# Patient Record
Sex: FEMALE | Race: WHITE | NOT HISPANIC OR LATINO | Employment: FULL TIME
[De-identification: names, ages, dates, MRNs, and addresses within clinical notes are randomized per-mention and may not be internally consistent; named-entity substitution may affect disease eponyms.]

---

## 2022-10-15 ENCOUNTER — HEALTH MAINTENANCE LETTER (OUTPATIENT)
Age: 37
End: 2022-10-15

## 2023-10-29 ENCOUNTER — HEALTH MAINTENANCE LETTER (OUTPATIENT)
Age: 38
End: 2023-10-29

## 2023-11-30 ENCOUNTER — APPOINTMENT (OUTPATIENT)
Dept: GENERAL RADIOLOGY | Facility: CLINIC | Age: 38
End: 2023-11-30
Attending: EMERGENCY MEDICINE
Payer: COMMERCIAL

## 2023-11-30 ENCOUNTER — HOSPITAL ENCOUNTER (EMERGENCY)
Facility: CLINIC | Age: 38
Discharge: HOME | End: 2023-11-30
Attending: EMERGENCY MEDICINE | Admitting: EMERGENCY MEDICINE
Payer: COMMERCIAL

## 2023-11-30 VITALS
TEMPERATURE: 97.7 F | HEART RATE: 101 BPM | RESPIRATION RATE: 16 BRPM | BODY MASS INDEX: 32.49 KG/M2 | HEIGHT: 65 IN | SYSTOLIC BLOOD PRESSURE: 135 MMHG | OXYGEN SATURATION: 100 % | DIASTOLIC BLOOD PRESSURE: 95 MMHG | WEIGHT: 195 LBS

## 2023-11-30 DIAGNOSIS — K80.20 GALLSTONES: ICD-10-CM

## 2023-11-30 DIAGNOSIS — R55 SYNCOPE AND COLLAPSE: Primary | ICD-10-CM

## 2023-11-30 DIAGNOSIS — D72.829 LEUKOCYTOSIS, UNSPECIFIED TYPE: ICD-10-CM

## 2023-11-30 DIAGNOSIS — U07.1 COVID-19 VIRUS INFECTION: ICD-10-CM

## 2023-11-30 LAB
ALBUMIN SERPL BCG-MCNC: 4.7 G/DL (ref 3.5–5.2)
ALP SERPL-CCNC: 78 U/L (ref 40–150)
ALT SERPL W P-5'-P-CCNC: 37 U/L (ref 0–50)
ANION GAP SERPL CALCULATED.3IONS-SCNC: 20 MMOL/L (ref 7–15)
AST SERPL W P-5'-P-CCNC: 45 U/L (ref 0–45)
ATRIAL RATE - MUSE: 99 BPM
BASOPHILS # BLD AUTO: 0.1 10E3/UL (ref 0–0.2)
BASOPHILS NFR BLD AUTO: 0 %
BILIRUB SERPL-MCNC: 1.1 MG/DL
BUN SERPL-MCNC: 12.4 MG/DL (ref 6–20)
CALCIUM SERPL-MCNC: 10.3 MG/DL (ref 8.6–10)
CHLORIDE SERPL-SCNC: 97 MMOL/L (ref 98–107)
CREAT SERPL-MCNC: 0.73 MG/DL (ref 0.51–0.95)
D DIMER PPP FEU-MCNC: <0.27 UG/ML FEU (ref 0–0.5)
DEPRECATED HCO3 PLAS-SCNC: 17 MMOL/L (ref 22–29)
DIASTOLIC BLOOD PRESSURE - MUSE: NORMAL MMHG
EGFRCR SERPLBLD CKD-EPI 2021: >90 ML/MIN/1.73M2
EOSINOPHIL # BLD AUTO: 0 10E3/UL (ref 0–0.7)
EOSINOPHIL NFR BLD AUTO: 0 %
ERYTHROCYTE [DISTWIDTH] IN BLOOD BY AUTOMATED COUNT: 15.1 % (ref 10–15)
FLUAV RNA SPEC QL NAA+PROBE: NEGATIVE
FLUBV RNA RESP QL NAA+PROBE: NEGATIVE
GLUCOSE BLDC GLUCOMTR-MCNC: 179 MG/DL (ref 70–99)
GLUCOSE SERPL-MCNC: 182 MG/DL (ref 70–99)
HCG SERPL QL: NEGATIVE
HCT VFR BLD AUTO: 46.3 % (ref 35–47)
HGB BLD-MCNC: 14.3 G/DL (ref 11.7–15.7)
IMM GRANULOCYTES # BLD: 0.1 10E3/UL
IMM GRANULOCYTES NFR BLD: 1 %
INTERPRETATION ECG - MUSE: NORMAL
LYMPHOCYTES # BLD AUTO: 2 10E3/UL (ref 0.8–5.3)
LYMPHOCYTES NFR BLD AUTO: 9 %
MAGNESIUM SERPL-MCNC: 1.7 MG/DL (ref 1.7–2.3)
MCH RBC QN AUTO: 24.9 PG (ref 26.5–33)
MCHC RBC AUTO-ENTMCNC: 30.9 G/DL (ref 31.5–36.5)
MCV RBC AUTO: 81 FL (ref 78–100)
MONOCYTES # BLD AUTO: 0.8 10E3/UL (ref 0–1.3)
MONOCYTES NFR BLD AUTO: 4 %
NEUTROPHILS # BLD AUTO: 19.3 10E3/UL (ref 1.6–8.3)
NEUTROPHILS NFR BLD AUTO: 86 %
NRBC # BLD AUTO: 0 10E3/UL
NRBC BLD AUTO-RTO: 0 /100
P AXIS - MUSE: 35 DEGREES
PLATELET # BLD AUTO: 277 10E3/UL (ref 150–450)
POTASSIUM SERPL-SCNC: 3.8 MMOL/L (ref 3.4–5.3)
PR INTERVAL - MUSE: 144 MS
PROT SERPL-MCNC: 9 G/DL (ref 6.4–8.3)
QRS DURATION - MUSE: 84 MS
QT - MUSE: 348 MS
QTC - MUSE: 446 MS
R AXIS - MUSE: 39 DEGREES
RBC # BLD AUTO: 5.75 10E6/UL (ref 3.8–5.2)
RSV RNA SPEC NAA+PROBE: NEGATIVE
SARS-COV-2 RNA RESP QL NAA+PROBE: POSITIVE
SODIUM SERPL-SCNC: 134 MMOL/L (ref 135–145)
SYSTOLIC BLOOD PRESSURE - MUSE: NORMAL MMHG
T AXIS - MUSE: 41 DEGREES
TROPONIN T SERPL HS-MCNC: <6 NG/L
TSH SERPL DL<=0.005 MIU/L-ACNC: 1.51 UIU/ML (ref 0.3–4.2)
VENTRICULAR RATE- MUSE: 99 BPM
WBC # BLD AUTO: 22.3 10E3/UL (ref 4–11)

## 2023-11-30 PROCEDURE — 99285 EMERGENCY DEPT VISIT HI MDM: CPT | Mod: 25

## 2023-11-30 PROCEDURE — 36415 COLL VENOUS BLD VENIPUNCTURE: CPT | Performed by: EMERGENCY MEDICINE

## 2023-11-30 PROCEDURE — 71046 X-RAY EXAM CHEST 2 VIEWS: CPT

## 2023-11-30 PROCEDURE — 84443 ASSAY THYROID STIM HORMONE: CPT | Performed by: EMERGENCY MEDICINE

## 2023-11-30 PROCEDURE — 258N000003 HC RX IP 258 OP 636: Performed by: EMERGENCY MEDICINE

## 2023-11-30 PROCEDURE — 96361 HYDRATE IV INFUSION ADD-ON: CPT

## 2023-11-30 PROCEDURE — 83735 ASSAY OF MAGNESIUM: CPT | Performed by: EMERGENCY MEDICINE

## 2023-11-30 PROCEDURE — 85379 FIBRIN DEGRADATION QUANT: CPT | Performed by: EMERGENCY MEDICINE

## 2023-11-30 PROCEDURE — 85004 AUTOMATED DIFF WBC COUNT: CPT | Performed by: EMERGENCY MEDICINE

## 2023-11-30 PROCEDURE — 80053 COMPREHEN METABOLIC PANEL: CPT | Performed by: EMERGENCY MEDICINE

## 2023-11-30 PROCEDURE — 82962 GLUCOSE BLOOD TEST: CPT

## 2023-11-30 PROCEDURE — 87637 SARSCOV2&INF A&B&RSV AMP PRB: CPT | Performed by: EMERGENCY MEDICINE

## 2023-11-30 PROCEDURE — 93005 ELECTROCARDIOGRAM TRACING: CPT

## 2023-11-30 PROCEDURE — 250N000011 HC RX IP 250 OP 636: Mod: JZ | Performed by: STUDENT IN AN ORGANIZED HEALTH CARE EDUCATION/TRAINING PROGRAM

## 2023-11-30 PROCEDURE — 96374 THER/PROPH/DIAG INJ IV PUSH: CPT

## 2023-11-30 PROCEDURE — 84703 CHORIONIC GONADOTROPIN ASSAY: CPT | Performed by: EMERGENCY MEDICINE

## 2023-11-30 PROCEDURE — 85025 COMPLETE CBC W/AUTO DIFF WBC: CPT | Performed by: EMERGENCY MEDICINE

## 2023-11-30 PROCEDURE — 84484 ASSAY OF TROPONIN QUANT: CPT | Performed by: EMERGENCY MEDICINE

## 2023-11-30 RX ORDER — ONDANSETRON 2 MG/ML
4 INJECTION INTRAMUSCULAR; INTRAVENOUS ONCE
Status: COMPLETED | OUTPATIENT
Start: 2023-11-30 | End: 2023-11-30

## 2023-11-30 RX ADMIN — SODIUM CHLORIDE 1000 ML: 9 INJECTION, SOLUTION INTRAVENOUS at 13:36

## 2023-11-30 RX ADMIN — ONDANSETRON 4 MG: 2 INJECTION INTRAMUSCULAR; INTRAVENOUS at 15:09

## 2023-11-30 ASSESSMENT — ACTIVITIES OF DAILY LIVING (ADL): ADLS_ACUITY_SCORE: 37

## 2023-11-30 NOTE — ED PROVIDER NOTES
"  History     Chief Complaint:  Syncope    The history is provided by the patient.      Cameron Madonna Dubin is a 38 year old female syncope. Patient passed out at work earlier today. She only has history of one other syncopal episode. Patient experienced preeclampsia with her most recent pregnancy. She experienced seizures, but is not on seizure medication currently. She thought she was having a seizure during this episode. A witness reports her face got clammy. She felt cold, then hot, wobbly, and tired. A witness caught her before she passed out. It is unknown if there was shaking during this episode. She endorses a mild cough for a month. It is difficult for her to breathe upon examination. No chest pain, abdominal pain, vomiting, diarrhea, headache, fever, sore throat, pain with urination, increased frequency, vaginal discharge, or vaginal bleeding. No history of blood clots.     Independent Historian:    present and corroborates the above.     Review of External Notes:   None     Medications:    Amlodipine   Labetalol   Lisinopril - hydrochlorothiazide   Glipizide XL  Metformin     Past Medical History:    Gestational diabetes   Hypertension   Seizures   SROM  Hyperlipidemia  Cholelithiasis   Diabetes     Past Surgical History:    Oral surgery      Physical Exam   Patient Vitals for the past 24 hrs:   BP Temp Temp src Pulse Resp SpO2 Height Weight   11/30/23 1255 (!) 135/95 97.7  F (36.5  C) Temporal 101 16 100 % 1.651 m (5' 5\") 88.5 kg (195 lb)      Physical Exam  General: Alert, appears well-developed and well-nourished. Cooperative.     In mild distress  HEENT:  Head:  Atraumatic  Ears:  External ears are normal  Mouth/Throat:  Oropharynx is without erythema or exudate and mucous membranes are moist.   Eyes:   Conjunctivae normal and EOM are normal. No scleral icterus.  CV:  Normal rate, regular rhythm, normal heart sounds and radial pulses are 2+ and symmetric.  No murmur.  Resp:  Breath sounds are " clear bilaterally    Non-labored, no retractions or accessory muscle use  GI:  Abdomen is soft, no distension, no tenderness. No rebound or guarding.  No CVA tenderness bilaterally  MS:  Normal range of motion. No edema.    Normal strength in all 4 extremities.     Back atraumatic.    No midline cervical, thoracic, or lumbar tenderness  Skin:  Warm and dry.  No rash or lesions noted.  Neuro:   Alert. Normal strength.  GCS: 15  Psych: Normal mood and affect.    Emergency Department Course   ECG  ECG results from 11/30/23   EKG 12-lead, tracing only     Value    Systolic Blood Pressure     Diastolic Blood Pressure     Ventricular Rate 99    Atrial Rate 99    UT Interval 144    QRS Duration 84        QTc 446    P Axis 35    R AXIS 39    T Axis 41    Interpretation ECG      Sinus rhythm  Cannot rule out Anterior infarct (cited on or before 17-FEB-2015)  Abnormal ECG  When compared with ECG of 17-FEB-2015 01:54,  No significant change was found  Confirmed by GENERATED REPORT, COMPUTER ([id number]),  Graciela Stevens ([id number]) on 11/30/2023 1:14:47 PM       Imaging:  XR Chest 2 Views   Final Result   IMPRESSION: No acute cardiopulmonary disease. Cholelithiasis.      ALEX PINEDA MD            SYSTEM ID:  [id number]         Laboratory:  Labs Ordered and Resulted from Time of ED Arrival to Time of ED Departure   COMPREHENSIVE METABOLIC PANEL - Abnormal       Result Value    Sodium 134 (*)     Potassium 3.8      Carbon Dioxide (CO2) 17 (*)     Anion Gap 20 (*)     Urea Nitrogen 12.4      Creatinine 0.73      GFR Estimate >90      Calcium 10.3 (*)     Chloride 97 (*)     Glucose 182 (*)     Alkaline Phosphatase 78      AST 45      ALT 37      Protein Total 9.0 (*)     Albumin 4.7      Bilirubin Total 1.1     CBC WITH PLATELETS AND DIFFERENTIAL - Abnormal    WBC Count 22.3 (*)     RBC Count 5.75 (*)     Hemoglobin 14.3      Hematocrit 46.3      MCV 81      MCH 24.9 (*)     MCHC 30.9 (*)     RDW 15.1 (*)     Platelet  Count 277      % Neutrophils 86      % Lymphocytes 9      % Monocytes 4      % Eosinophils 0      % Basophils 0      % Immature Granulocytes 1      NRBCs per 100 WBC 0      Absolute Neutrophils 19.3 (*)     Absolute Lymphocytes 2.0      Absolute Monocytes 0.8      Absolute Eosinophils 0.0      Absolute Basophils 0.1      Absolute Immature Granulocytes 0.1      Absolute NRBCs 0.0     GLUCOSE BY METER - Abnormal    GLUCOSE BY METER POCT 179 (*)    INFLUENZA A/B, RSV, & SARS-COV2 PCR - Abnormal    Influenza A PCR Negative      Influenza B PCR Negative      RSV PCR Negative      SARS CoV2 PCR Positive (*)    HCG QUALITATIVE PREGNANCY - Normal    hCG Serum Qualitative Negative     D DIMER QUANTITATIVE - Normal    D-Dimer Quantitative <0.27     TROPONIN T, HIGH SENSITIVITY - Normal    Troponin T, High Sensitivity <6     MAGNESIUM - Normal    Magnesium 1.7     TSH WITH FREE T4 REFLEX - Normal    TSH 1.51       Emergency Department Course & Assessments:    Interventions:  Medications   sodium chloride 0.9% BOLUS 1,000 mL (0 mLs Intravenous Stopped 11/30/23 1523)   ondansetron (ZOFRAN) injection 4 mg (4 mg Intravenous $Given 11/30/23 1509)      Independent Interpretation (X-rays, CTs, rhythm strip):  I reviewed the chest XR, no evidence of pneumonia or pneumothorax.     Assessments/Consultations/Discussion of Management or Tests:  ED Course as of 11/30/23 2105   Thu Nov 30, 2023   1325 I obtained the history and examined the patient as noted above.        1509 I rechecked and updated the patient.        Social Determinants of Health affecting care:   None    Disposition:  The patient was discharged to home.     Impression & Plan      Medical Decision Making:  Patient is a 38-year-old female who presents after a syncopal event while at work today.  There is initial concern in the triage note that the patient may have had a seizure but this was an unwitnessed event by anyone here in the emergency department and apparently her  coworker was with her at the time and states that she ultimately collapsed after having a warmth sensation while standing.  She did not have any injuries as her coworker assisted her to the ground.  It sounds that the patient had either a vasovagal or syncopal event.  She was mildly nauseous here and had a single episode of emesis while in triage.  She does not have any persistent nausea nor abdominal pain on our examination.  Of note the patient was positive for COVID on viral swab today.  This may be due to recent cough approximately 1 to 2 weeks ago.  Chest x-ray shows no signs of pneumonia nor pneumothorax.  EKG showed no concerning ischemic changes nor arrhythmias.  Troponin undetectable and low suspicion for ACS.  D-dimer undetectable low suspicion for pulmonary embolism.  Patient did have a significant leukocytosis of unclear etiology.  This may be a stress demargination versus suspicious for infectious process.  Unlikely related to COVID as typically leukopenia is seen in COVID.  Laboratory work beyond white count appears relatively normal.  Chest x-ray did not show signs of pneumonia but did see gallstones.  Again the patient did not have any abdominal pain and LFTs and bilirubin appear normal on lab work.  Low concern for cholecystitis.  Given no focal or localizing abdominal pain I would not perform advanced imaging at this point.  We did recommend a urinalysis although the patient was unable to provide a urinalysis urine denies any dysuria or urinary frequency.  She understood that with a high white blood cell count I would like to evaluate for acute cystitis but given she cannot provide a urine and she would prefer to discharge home at this time will defer urinalysis to follow-up with primary care as needed.  Given high white blood cell count we will plan to have the patient follow-up with her primary care provider in 1 week for recheck.  Certainly she is aware of the elevated white blood cell count and  should she develop a fever, new abdominal pain, or persistent vomiting unable to tolerate oral intake she will return to the emergency department for repeat assessment.  Patient has not had any repeat events of syncope here.  She feels improved after IV fluids.  She understands importance of follow-up with primary care.  After all questions answered and return precautions understood, discharged home.    Diagnosis:    ICD-10-CM    1. Syncope and collapse  R55       2. Leukocytosis, unspecified type  D72.829       3. COVID-19 virus infection  U07.1       4. Gallstones  K80.20     Seen on CXR on 11/30/23         Discharge Medications:  Discharge Medication List as of 11/30/2023  3:23 PM         Scribe Disclosure:  I, Asia Molina, am serving as a scribe at 2:34 PM on 11/30/2023 to document services personally performed by Kishan Hull MD based on my observations and the provider's statements to me.   11/30/2023   No att. providers found        Kishan Hull MD  11/30/23 [id number]

## 2023-11-30 NOTE — ED TRIAGE NOTES
Patient presents to ED with complaints of a possible seizure. States has had seizures in the past when she is postpartum but it has been many years. States that she was feeling chills and fatigue this morning, went to the bathroom and blacked out. Her coworker caught her and she did not hit her head. Unsure if syncope or seizure.      Triage Assessment (Adult)       Row Name 11/30/23 1257          Triage Assessment    Airway WDL WDL        Respiratory WDL    Respiratory WDL WDL        Skin Circulation/Temperature WDL    Skin Circulation/Temperature WDL WDL        Cardiac WDL    Cardiac WDL --  tachy low 100s        Peripheral/Neurovascular WDL    Peripheral Neurovascular WDL WDL        Cognitive/Neuro/Behavioral WDL    Cognitive/Neuro/Behavioral WDL WDL

## 2024-12-21 ENCOUNTER — HEALTH MAINTENANCE LETTER (OUTPATIENT)
Age: 39
End: 2024-12-21